# Patient Record
Sex: FEMALE | Race: WHITE | HISPANIC OR LATINO | Employment: UNEMPLOYED | URBAN - METROPOLITAN AREA
[De-identification: names, ages, dates, MRNs, and addresses within clinical notes are randomized per-mention and may not be internally consistent; named-entity substitution may affect disease eponyms.]

---
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
## 2025-02-21 ENCOUNTER — OFFICE VISIT (OUTPATIENT)
Dept: FAMILY MEDICINE CLINIC | Facility: CLINIC | Age: 20
End: 2025-02-21
Payer: MEDICAID

## 2025-02-21 VITALS
HEART RATE: 65 BPM | OXYGEN SATURATION: 100 % | SYSTOLIC BLOOD PRESSURE: 91 MMHG | DIASTOLIC BLOOD PRESSURE: 61 MMHG | TEMPERATURE: 97.3 F | BODY MASS INDEX: 24.29 KG/M2 | HEIGHT: 62 IN | RESPIRATION RATE: 16 BRPM | WEIGHT: 132 LBS

## 2025-02-21 DIAGNOSIS — N92.6 IRREGULAR PERIODS: ICD-10-CM

## 2025-02-21 DIAGNOSIS — Z00.00 ANNUAL PHYSICAL EXAM: ICD-10-CM

## 2025-02-21 DIAGNOSIS — Z01.419 WOMEN'S ANNUAL ROUTINE GYNECOLOGICAL EXAMINATION: Primary | ICD-10-CM

## 2025-02-21 DIAGNOSIS — F41.8 ANXIETY WITH DEPRESSION: ICD-10-CM

## 2025-02-21 DIAGNOSIS — R63.5 WEIGHT GAIN: ICD-10-CM

## 2025-02-21 DIAGNOSIS — Z86.39 HISTORY OF THYROID DISEASE: ICD-10-CM

## 2025-02-21 DIAGNOSIS — Z83.49 FAMILY HISTORY OF THYROID DISEASE: ICD-10-CM

## 2025-02-21 PROCEDURE — 80053 COMPREHEN METABOLIC PANEL: CPT | Performed by: NURSE PRACTITIONER

## 2025-02-21 PROCEDURE — 83036 HEMOGLOBIN GLYCOSYLATED A1C: CPT | Performed by: NURSE PRACTITIONER

## 2025-02-21 PROCEDURE — 84443 ASSAY THYROID STIM HORMONE: CPT | Performed by: NURSE PRACTITIONER

## 2025-02-21 PROCEDURE — 80061 LIPID PANEL: CPT | Performed by: NURSE PRACTITIONER

## 2025-02-21 NOTE — ASSESSMENT & PLAN NOTE
Irregular periods- She reports she will not have her period some months and other months she will have it for weeks and is heavy.

## 2025-02-21 NOTE — PATIENT INSTRUCTIONS
Taper off zoloft very slowly- take 1/2 tablet every day for 2 weeks, then every other day for a week, and then every 3rd day and so on   Continue counseling   Encourage healthy diet and exercise  Encouraged monthly self breast exams  Check labs  Referral to GYN

## 2025-02-21 NOTE — ASSESSMENT & PLAN NOTE
Annual physical- Denies any new family history. Denies smoking, drinking, or illegal drug use.       Labs-Due  Papsmear-Due      Vaccines:  Covid-19-Received some doses, not sure if receiving anymore    Dental exam-UTD   Eye exam-UTD        Encourage healthy diet and exercise  Encouraged monthly self breast exams  Check labs  Referral to GYN

## 2025-02-21 NOTE — PROGRESS NOTES
Venipuncture performed in [facility] by RT Sin  with good hemostasis. Patient tolerated well. 02/21/25 Mikala Hull, RT

## 2025-02-21 NOTE — ASSESSMENT & PLAN NOTE
Anxiety with depression- She is wanting to get off the zoloft because she has gained weight and having changes in her body. She does see therapist. Denies SI or HI.     Taper off zoloft very slowly- take 1/2 tablet every day for 2 weeks, then every other day for a week, and then every 3rd day and so on   Continue counseling - She reports she does this once a week

## 2025-02-21 NOTE — ASSESSMENT & PLAN NOTE
She has had fluctuating weight gain since November.     Could be secondary to possible PCOS versus side effect of zoloft    Patient is tapering off zoloft slowly  Referral to GYN for possible PCOS

## 2025-02-21 NOTE — PROGRESS NOTES
"Chief Complaint  Chief Complaint   Patient presents with    Annual Exam     Physical. Patient has gained weight fast,her menstrual cycles are heavy and irregular,she is having a lot of anxiety and trouble sleeping.         Subjective          Arlene Storng is a 19-year-old female who presents to the office today due to annual physical.     Annual physical- Denies any new family history. Denies smoking, drinking, or illegal drug use.     Irregular periods- She reports she will not have her period some months and other months she will have it for weeks and is heavy.     Anxiety with depression- She is wanting to get off the zoloft because she has gained weight and having changes in her body. She does see therapist. Denies SI or HI.           Labs-Due  Papsmear-Due      Vaccines:  Covid-19-Received some doses, not sure if receiving anymore    Dental exam-UTD   Eye exam-UTD          Review of Systems   Constitutional:  Negative for chills and fever.   Respiratory:  Positive for shortness of breath.    Cardiovascular:  Negative for chest pain.   Gastrointestinal:  Negative for abdominal pain, nausea and vomiting.   Genitourinary:  Negative for difficulty urinating.   Musculoskeletal:  Negative for myalgias.   Skin: Negative.    Neurological:  Negative for dizziness, light-headedness and headache.   Psychiatric/Behavioral:  Positive for depressed mood. Negative for self-injury and suicidal ideas. The patient is nervous/anxious.         Objective   Vital Signs:   Vitals:    02/21/25 1126   BP: 91/61   Pulse: 65   Resp: 16   Temp: 97.3 °F (36.3 °C)   SpO2: 100%      Estimated body mass index is 24.14 kg/m² as calculated from the following:    Height as of this encounter: 157.5 cm (62\").    Weight as of this encounter: 59.9 kg (132 lb).    Pediatric BMI = 73 %ile (Z= 0.61) based on CDC (Girls, 2-20 Years) BMI-for-age based on BMI available on 2/21/2025.. BMI is within normal parameters. No other follow-up for BMI " required.                    Physical Exam  Vitals reviewed.   Constitutional:       Appearance: Normal appearance. She is normal weight.   HENT:      Head: Normocephalic and atraumatic.      Right Ear: Tympanic membrane, ear canal and external ear normal.      Left Ear: Tympanic membrane, ear canal and external ear normal.      Nose: Nose normal.      Mouth/Throat:      Mouth: Mucous membranes are moist.      Pharynx: Oropharynx is clear.   Eyes:      Extraocular Movements: Extraocular movements intact.      Conjunctiva/sclera: Conjunctivae normal.      Pupils: Pupils are equal, round, and reactive to light.   Cardiovascular:      Rate and Rhythm: Normal rate and regular rhythm.      Pulses: Normal pulses.      Heart sounds: Normal heart sounds.      Comments: S1, S2 audible  Pulmonary:      Effort: Pulmonary effort is normal.      Breath sounds: Normal breath sounds.      Comments: On room air   Abdominal:      General: Abdomen is flat. Bowel sounds are normal.      Palpations: Abdomen is soft.   Musculoskeletal:         General: Normal range of motion.      Cervical back: Normal range of motion and neck supple.   Skin:     General: Skin is warm and dry.   Neurological:      General: No focal deficit present.      Mental Status: She is alert and oriented to person, place, and time. Mental status is at baseline.   Psychiatric:         Mood and Affect: Mood normal.         Behavior: Behavior normal.         Thought Content: Thought content normal.         Judgment: Judgment normal.                Physical Exam   Result Review :             Procedures       Assessment and Plan     Diagnoses and all orders for this visit:    1. Women's annual routine gynecological examination (Primary)  -     Ambulatory Referral to Obstetrics / Gynecology    2. Irregular periods  Assessment & Plan:  Irregular periods- She reports she will not have her period some months and other months she will have it for weeks and is heavy.      Orders:  -     Ambulatory Referral to Obstetrics / Gynecology    3. Annual physical exam  Assessment & Plan:    Annual physical- Denies any new family history. Denies smoking, drinking, or illegal drug use.       Labs-Due  Papsmear-Due      Vaccines:  Covid-19-Received some doses, not sure if receiving anymore    Dental exam-UTD   Eye exam-UTD        Encourage healthy diet and exercise  Encouraged monthly self breast exams  Check labs  Referral to GYN    Orders:  -     Comprehensive Metabolic Panel  -     Hemoglobin A1c  -     Lipid Panel    4. Anxiety with depression  Assessment & Plan:  Anxiety with depression- She is wanting to get off the zoloft because she has gained weight and having changes in her body. She does see therapist. Denies SI or HI.     Taper off zoloft very slowly- take 1/2 tablet every day for 2 weeks, then every other day for a week, and then every 3rd day and so on   Continue counseling - She reports she does this once a week       5. History of thyroid disease    6. Family history of thyroid disease  -     TSH    7. Weight gain  Assessment & Plan:  She has had fluctuating weight gain since November.     Could be secondary to possible PCOS versus side effect of zoloft    Patient is tapering off zoloft slowly  Referral to GYN for possible PCOS                 Follow Up   Return in about 1 year (around 2/21/2026) for Annual physical.   Patient was given instructions and counseling regarding her condition or for health maintenance advice. Please see specific information pulled into the AVS if appropriate.

## 2025-02-22 LAB
ALBUMIN SERPL-MCNC: 4.5 G/DL (ref 3.5–5.2)
ALBUMIN/GLOB SERPL: 1.3 G/DL
ALP SERPL-CCNC: 134 U/L (ref 39–117)
ALT SERPL W P-5'-P-CCNC: 65 U/L (ref 1–33)
ANION GAP SERPL CALCULATED.3IONS-SCNC: 12 MMOL/L (ref 5–15)
AST SERPL-CCNC: 42 U/L (ref 1–32)
BILIRUB SERPL-MCNC: 0.4 MG/DL (ref 0–1.2)
BUN SERPL-MCNC: 8 MG/DL (ref 6–20)
BUN/CREAT SERPL: 12.7 (ref 7–25)
CALCIUM SPEC-SCNC: 9.7 MG/DL (ref 8.6–10.5)
CHLORIDE SERPL-SCNC: 100 MMOL/L (ref 98–107)
CHOLEST SERPL-MCNC: 197 MG/DL (ref 0–200)
CO2 SERPL-SCNC: 24 MMOL/L (ref 22–29)
CREAT SERPL-MCNC: 0.63 MG/DL (ref 0.57–1)
EGFRCR SERPLBLD CKD-EPI 2021: 131.2 ML/MIN/1.73
GLOBULIN UR ELPH-MCNC: 3.4 GM/DL
GLUCOSE SERPL-MCNC: 78 MG/DL (ref 65–99)
HBA1C MFR BLD: 5.3 % (ref 4.8–5.6)
HDLC SERPL-MCNC: 61 MG/DL (ref 40–60)
LDLC SERPL CALC-MCNC: 125 MG/DL (ref 0–100)
LDLC/HDLC SERPL: 2.03 {RATIO}
POTASSIUM SERPL-SCNC: 4.1 MMOL/L (ref 3.5–5.2)
PROT SERPL-MCNC: 7.9 G/DL (ref 6–8.5)
SODIUM SERPL-SCNC: 136 MMOL/L (ref 136–145)
TRIGL SERPL-MCNC: 61 MG/DL (ref 0–150)
TSH SERPL DL<=0.05 MIU/L-ACNC: 2.21 UIU/ML (ref 0.27–4.2)
VLDLC SERPL-MCNC: 11 MG/DL (ref 5–40)

## 2025-02-24 DIAGNOSIS — R79.89 ELEVATED LFTS: Primary | ICD-10-CM

## 2025-05-20 ENCOUNTER — TRANSCRIBE ORDERS (OUTPATIENT)
Dept: ADMINISTRATIVE | Facility: HOSPITAL | Age: 20
End: 2025-05-20
Payer: MEDICAID

## 2025-05-20 DIAGNOSIS — E22.9 PITUITARY HYPERFUNCTION: Primary | ICD-10-CM

## 2025-05-28 NOTE — PROGRESS NOTES
"Chief Complaint  Chief Complaint   Patient presents with    Thyroid Problem    Anxiety    Depression    Weight Check        Subjective          Arlene Strong is a 20-year-old female who reports to the office today for follow-up on anxiety or depression.    Anxiety with depression- She is on wellbutrin and it is helping her with her appetite, depression, or anxiety. Denies SI or HI.     Possible pituitary issues- She saw GYN and has labs drawn that showed elevated testosterone and estrogen. Gyn thinks possible cyst on pituitary gland and she has an MRI on Monday. She was prescribed wellbutrin from GYN.               Review of Systems   Constitutional:  Negative for chills and fever.   HENT:  Negative for congestion.    Respiratory:  Negative for shortness of breath.    Cardiovascular:  Negative for chest pain.   Gastrointestinal:  Negative for abdominal pain, nausea and vomiting.   Genitourinary:  Negative for difficulty urinating.   Musculoskeletal:  Negative for myalgias.   Skin: Negative.    Neurological:  Negative for dizziness, light-headedness and headache.   Psychiatric/Behavioral:  Negative for self-injury, suicidal ideas and depressed mood. The patient is not nervous/anxious.         Objective   Vital Signs:   Vitals:    06/03/25 1608   BP: 97/65   Pulse: 82   Temp: 98.6 °F (37 °C)   SpO2: 97%      Estimated body mass index is 24.69 kg/m² as calculated from the following:    Height as of this encounter: 157.5 cm (62.01\").    Weight as of this encounter: 61.2 kg (135 lb).    BMI is within normal parameters. No other follow-up for BMI required.            Patient screened negative for depression based on a PHQ-9 score of 0 on 6/3/2025 . Follow-up recommendations include: PCP managing depression.        Physical Exam  Vitals reviewed.   Constitutional:       Appearance: Normal appearance. She is normal weight.   HENT:      Head: Normocephalic and atraumatic.      Nose: Nose normal.      Mouth/Throat:      Mouth: " Mucous membranes are moist.      Pharynx: Oropharynx is clear.   Eyes:      Extraocular Movements: Extraocular movements intact.      Conjunctiva/sclera: Conjunctivae normal.   Cardiovascular:      Rate and Rhythm: Normal rate and regular rhythm.      Pulses: Normal pulses.      Heart sounds: Normal heart sounds.      Comments: S1, S2 audible  Pulmonary:      Effort: Pulmonary effort is normal.      Breath sounds: Normal breath sounds.      Comments: On room air   Abdominal:      General: Abdomen is flat.   Musculoskeletal:         General: Normal range of motion.      Cervical back: Normal range of motion.   Skin:     General: Skin is warm and dry.   Neurological:      General: No focal deficit present.      Mental Status: She is alert and oriented to person, place, and time. Mental status is at baseline.   Psychiatric:         Mood and Affect: Mood normal.         Behavior: Behavior normal.         Thought Content: Thought content normal.         Judgment: Judgment normal.                Physical Exam   Result Review :             Procedures       Assessment and Plan     Diagnoses and all orders for this visit:    1. Anxiety with depression (Primary)  Assessment & Plan:  Anxiety with depression- She is on wellbutrin and it is helping her with her appetite, depression, or anxiety. Denies SI or HI.       Continue wellbutrin      2. Pituitary hyperfunction  Assessment & Plan:  Patient has MRI of pituitary gland and US of ovaries ordered for tomorrow at [facility]    GYN ruling out PCOS       3. Elevated LFTs  Assessment & Plan:  Check BMP  She reports she has been eating a lot healthier     Orders:  -     Comprehensive Metabolic Panel; Future    Other orders  -     buPROPion (WELLBUTRIN) 75 MG tablet; Take 1 tablet by mouth Daily.  Dispense: 90 tablet; Refill: 1              Follow Up   Return for Next scheduled follow up.   Patient was given instructions and counseling regarding her condition or for health maintenance  advice. Please see specific information pulled into the AVS if appropriate.

## 2025-06-03 ENCOUNTER — OFFICE VISIT (OUTPATIENT)
Dept: FAMILY MEDICINE CLINIC | Facility: CLINIC | Age: 20
End: 2025-06-03
Payer: MEDICAID

## 2025-06-03 VITALS
OXYGEN SATURATION: 97 % | WEIGHT: 135 LBS | SYSTOLIC BLOOD PRESSURE: 97 MMHG | HEIGHT: 62 IN | TEMPERATURE: 98.6 F | BODY MASS INDEX: 24.84 KG/M2 | HEART RATE: 82 BPM | DIASTOLIC BLOOD PRESSURE: 65 MMHG

## 2025-06-03 DIAGNOSIS — F41.8 ANXIETY WITH DEPRESSION: Primary | ICD-10-CM

## 2025-06-03 DIAGNOSIS — R79.89 ELEVATED LFTS: ICD-10-CM

## 2025-06-03 DIAGNOSIS — E22.9 PITUITARY HYPERFUNCTION: ICD-10-CM

## 2025-06-03 PROBLEM — R63.5 WEIGHT GAIN: Status: RESOLVED | Noted: 2025-02-21 | Resolved: 2025-06-03

## 2025-06-03 PROCEDURE — 1160F RVW MEDS BY RX/DR IN RCRD: CPT | Performed by: NURSE PRACTITIONER

## 2025-06-03 PROCEDURE — 1159F MED LIST DOCD IN RCRD: CPT | Performed by: NURSE PRACTITIONER

## 2025-06-03 PROCEDURE — 99214 OFFICE O/P EST MOD 30 MIN: CPT | Performed by: NURSE PRACTITIONER

## 2025-06-03 PROCEDURE — 1126F AMNT PAIN NOTED NONE PRSNT: CPT | Performed by: NURSE PRACTITIONER

## 2025-06-03 RX ORDER — BUPROPION HYDROCHLORIDE 75 MG/1
75 TABLET ORAL DAILY
Qty: 90 TABLET | Refills: 1 | Status: SHIPPED | OUTPATIENT
Start: 2025-06-03

## 2025-06-03 RX ORDER — BUPROPION HYDROCHLORIDE 75 MG/1
1 TABLET ORAL DAILY
COMMUNITY
Start: 2025-05-13 | End: 2025-06-03 | Stop reason: SDUPTHER

## 2025-06-03 NOTE — ASSESSMENT & PLAN NOTE
Patient has MRI of pituitary gland and US of ovaries ordered for tomorrow at [facility]    GYN ruling out PCOS

## 2025-06-03 NOTE — ASSESSMENT & PLAN NOTE
Anxiety with depression- She is on wellbutrin and it is helping her with her appetite, depression, or anxiety. Denies SI or HI.       Continue wellbutrin

## 2025-06-09 ENCOUNTER — HOSPITAL ENCOUNTER (OUTPATIENT)
Dept: MRI IMAGING | Facility: HOSPITAL | Age: 20
Discharge: HOME | End: 2025-06-09
Admitting: STUDENT IN AN ORGANIZED HEALTH CARE EDUCATION/TRAINING PROGRAM
Payer: MEDICAID

## 2025-06-09 DIAGNOSIS — E22.9 PITUITARY HYPERFUNCTION: ICD-10-CM

## 2025-06-09 PROCEDURE — A9577 INJ MULTIHANCE: HCPCS | Performed by: STUDENT IN AN ORGANIZED HEALTH CARE EDUCATION/TRAINING PROGRAM

## 2025-06-09 PROCEDURE — 25510000002 GADOBENATE DIMEGLUMINE 529 MG/ML SOLUTION: Performed by: STUDENT IN AN ORGANIZED HEALTH CARE EDUCATION/TRAINING PROGRAM

## 2025-06-09 PROCEDURE — 70553 MRI BRAIN STEM W/O & W/DYE: CPT

## 2025-06-09 RX ADMIN — GADOBENATE DIMEGLUMINE 20 ML: 529 INJECTION, SOLUTION INTRAVENOUS at 15:35

## 2025-07-15 ENCOUNTER — OFFICE VISIT (OUTPATIENT)
Dept: FAMILY MEDICINE CLINIC | Facility: CLINIC | Age: 20
End: 2025-07-15
Payer: MEDICAID

## 2025-07-15 VITALS
TEMPERATURE: 98 F | OXYGEN SATURATION: 97 % | RESPIRATION RATE: 18 BRPM | BODY MASS INDEX: 24.84 KG/M2 | HEIGHT: 62 IN | SYSTOLIC BLOOD PRESSURE: 90 MMHG | WEIGHT: 135 LBS | HEART RATE: 107 BPM | DIASTOLIC BLOOD PRESSURE: 61 MMHG

## 2025-07-15 DIAGNOSIS — U07.1 COVID-19 VIRUS INFECTION: Primary | ICD-10-CM

## 2025-07-15 DIAGNOSIS — E28.2 PCOS (POLYCYSTIC OVARIAN SYNDROME): ICD-10-CM

## 2025-07-15 DIAGNOSIS — R05.9 COUGH, UNSPECIFIED TYPE: ICD-10-CM

## 2025-07-15 LAB
EXPIRATION DATE: ABNORMAL
EXPIRATION DATE: NORMAL
INTERNAL CONTROL: ABNORMAL
INTERNAL CONTROL: NORMAL
Lab: ABNORMAL
Lab: NORMAL
S PYO AG THROAT QL: NEGATIVE
SARS-COV-2 AG UPPER RESP QL IA.RAPID: DETECTED

## 2025-07-15 PROCEDURE — 87426 SARSCOV CORONAVIRUS AG IA: CPT | Performed by: FAMILY MEDICINE

## 2025-07-15 PROCEDURE — 1125F AMNT PAIN NOTED PAIN PRSNT: CPT | Performed by: FAMILY MEDICINE

## 2025-07-15 PROCEDURE — 1160F RVW MEDS BY RX/DR IN RCRD: CPT | Performed by: FAMILY MEDICINE

## 2025-07-15 PROCEDURE — 87880 STREP A ASSAY W/OPTIC: CPT | Performed by: FAMILY MEDICINE

## 2025-07-15 PROCEDURE — 99213 OFFICE O/P EST LOW 20 MIN: CPT | Performed by: FAMILY MEDICINE

## 2025-07-15 PROCEDURE — 1159F MED LIST DOCD IN RCRD: CPT | Performed by: FAMILY MEDICINE

## 2025-07-15 RX ORDER — CHLORAL HYDRATE 500 MG
1000 CAPSULE ORAL
COMMUNITY

## 2025-07-15 RX ORDER — NORETHINDRONE ACETATE AND ETHINYL ESTRADIOL, ETHINYL ESTRADIOL AND FERROUS FUMARATE 1MG-10(24)
1 KIT ORAL DAILY
Start: 2025-07-15

## 2025-07-15 NOTE — Clinical Note
July 15, 2025     Patient: Arlene Strong   YOB: 2005   Date of Visit: 7/15/2025       To Whom It May Concern:    It is my medical opinion that Arlene Strong may return to work in two days.         Sincerely,        Antonietta Hughes DO    CC: No Recipients

## 2025-07-15 NOTE — LETTER
July 15, 2025     Patient: Arlene Strong   YOB: 2005   Date of Visit: 7/15/2025       To Whom it May Concern:    Arlene Strong was seen in my clinic on 7/15/2025. She tested positive for COVID 19.           Sincerely,          Antonietta Hughes, DO

## 2025-07-15 NOTE — PROGRESS NOTES
Subjective   Arlene Strong is a 20 y.o. female.     Chief Complaint   Patient presents with    Sore Throat     Congestion,Fever,Sore throat,Fatigue,headache since yesterday.         Current Outpatient Medications:     Berberine Chloride (BERBERINE HCI PO), Take 1 tablet by mouth Daily., Disp: , Rfl:     buPROPion (WELLBUTRIN) 75 MG tablet, Take 1 tablet by mouth Daily., Disp: 90 tablet, Rfl: 1    INOSITOL PO, Take 1 tablet by mouth Daily., Disp: , Rfl:     Omega-3 Fatty Acids (fish oil) 1000 MG capsule capsule, Take 1 capsule by mouth Daily With Breakfast., Disp: , Rfl:     Probiotic Product (PROBIOTIC BLEND PO), Take 1 tablet by mouth Daily., Disp: , Rfl:     Norethin-Eth Estrad-Fe Biphas (Lo Loestrin Fe) 1 MG-10 MCG / 10 MCG tablet, Take 1 tablet by mouth Daily., Disp: , Rfl:     Past Medical History:   Diagnosis Date    Anxiety     Depression     Kidney stone 06/03/2024    passed with no complications    PCOS (polycystic ovarian syndrome)        History reviewed. No pertinent surgical history.    Family History   Problem Relation Age of Onset    Other Father         VERTIGO; MENIERES DISEASE; HEMAGIOBLASTOMA    Heart disease Maternal Grandmother     Diabetes Maternal Grandmother     Heart disease Maternal Grandfather     Diabetes Maternal Grandfather     Heart disease Paternal Grandmother     Diabetes Paternal Grandmother     Heart disease Paternal Grandfather     Diabetes Paternal Grandfather     Cancer Other        Social History     Socioeconomic History    Marital status: Single   Tobacco Use    Smoking status: Never     Passive exposure: Never    Smokeless tobacco: Never   Vaping Use    Vaping status: Never Used   Substance and Sexual Activity    Alcohol use: Never    Drug use: Never    Sexual activity: Defer       History of Present Illness  The patient is a 20-year-old female who presents with fever and sore throat.    She attended a music festival 2 weeks ago and has been socializing at various parties  since then. She reports a sore throat and a cough, which she believes may be worsening. She has to cough to clear the mucus. She has no history of asthma. She has had COVID-19 once before.    She was recently diagnosed with polycystic ovary syndrome (PCOS) by Dr. Nanci Sandoval, an OB/GYN at [facility] in [place]. She has gained approximately 30 pounds without any changes in her lifestyle. She has been attending the gym frequently and has made significant changes to her diet, including reducing her intake of fast food. She is currently focusing on weight loss and managing her symptoms of facial hair growth and hair loss. She has been prescribed Loestrin Fe for birth control but expresses apprehension about using it. Her menstrual cycles are irregular.    Diet: She has made significant changes to her diet, including reducing her intake of fast food.    GYNECOLOGICAL HISTORY:  Frequency and Flow: Irregular        The following portions of the patient's history were reviewed and updated as appropriate: allergies, current medications, past family history, past medical history, past social history, past surgical history and problem list.    Review of Systems   Constitutional:  Positive for activity change, fever and unexpected weight gain. Negative for appetite change, chills, fatigue and unexpected weight loss.   HENT:  Positive for congestion and sore throat. Negative for ear discharge, ear pain, postnasal drip, rhinorrhea, sinus pressure, sneezing and swollen glands.    Eyes:  Negative for pain, discharge, redness, itching and visual disturbance.   Respiratory:  Positive for cough. Negative for shortness of breath, wheezing and stridor.    Genitourinary:  Positive for menstrual problem.   Skin:  Negative for rash.   Allergic/Immunologic: Negative for environmental allergies.   Psychiatric/Behavioral:  Negative for sleep disturbance.        Vitals:    07/15/25 1133   BP: 90/61   Pulse: 107   Resp: 18    Temp: 98 °F (36.7 °C)   SpO2: 97%       Objective   Physical Exam  Vitals and nursing note reviewed.   Constitutional:       General: She is not in acute distress.     Appearance: She is well-developed. She is not ill-appearing, toxic-appearing or diaphoretic.   HENT:      Head: Normocephalic and atraumatic.      Right Ear: Tympanic membrane, ear canal and external ear normal. There is no impacted cerumen.      Left Ear: Tympanic membrane, ear canal and external ear normal. There is no impacted cerumen.      Nose: Nose normal. No congestion or rhinorrhea.      Mouth/Throat:      Mouth: Mucous membranes are moist.      Pharynx: Oropharynx is clear. Posterior oropharyngeal erythema present. No oropharyngeal exudate.   Eyes:      General: No scleral icterus.        Right eye: No discharge.         Left eye: No discharge.      Extraocular Movements: Extraocular movements intact.      Conjunctiva/sclera: Conjunctivae normal.      Pupils: Pupils are equal, round, and reactive to light.   Neck:      Thyroid: No thyromegaly.   Cardiovascular:      Rate and Rhythm: Normal rate and regular rhythm.      Heart sounds: Normal heart sounds. No murmur heard.  Pulmonary:      Effort: Pulmonary effort is normal. No respiratory distress.      Breath sounds: Normal breath sounds. No wheezing or rales.   Abdominal:      General: Bowel sounds are normal.      Palpations: Abdomen is soft.   Musculoskeletal:      Cervical back: Normal range of motion and neck supple.   Lymphadenopathy:      Cervical: No cervical adenopathy.   Skin:     General: Skin is warm and dry.      Coloration: Skin is not pale.      Findings: No erythema or rash.   Neurological:      Mental Status: She is alert and oriented to person, place, and time.      Cranial Nerves: No cranial nerve deficit.   Psychiatric:         Attention and Perception: Attention normal.         Mood and Affect: Mood and affect normal.         Speech: Speech normal.         Behavior:  Behavior normal. Behavior is cooperative.         Thought Content: Thought content normal.         Cognition and Memory: Cognition and memory normal.         Judgment: Judgment normal.       Physical Exam  Mouth/Throat: Irritation noted in the throat.  Respiratory: Clear to auscultation, no wheezing, rales or rhonchi     BMI is within normal parameters. No other follow-up for BMI required.      Assessment & Plan   Diagnoses and all orders for this visit:    1. COVID-19 virus infection (Primary)  -     POCT rapid strep A    2. Cough, unspecified type  -     Cancel: POCT SARS-CoV-2 Antigen ENEIDA + Flu  -     POCT SARS-CoV-2 Antigen ENEIDA    3. PCOS (polycystic ovarian syndrome)    Other orders  -     Norethin-Eth Estrad-Fe Biphas (Lo Loestrin Fe) 1 MG-10 MCG / 10 MCG tablet; Take 1 tablet by mouth Daily.      Assessment & Plan  1. COVID-19.  - Reports sore throat and worsening cough, requiring frequent clearing of mucus.  - No history of asthma; previously had COVID-19 once.  - Advised to use over-the-counter cough and cold medications, cough drops, and antihistamines such as Claritin, Allegra, or Zyrtec for postnasal drip. Cepacol or Chloraseptic lozenges recommended for severe sore throat.  - Prescription for Paxlovid will be provided if requested within the next few days.    2. Polycystic Ovary Syndrome (PCOS).  - Recently diagnosed with PCOS; under the care of Dr. Nanci Sandoval at [facility].  - Prescribed Loestrin Fe for birth control; expresses apprehension about using it.  - Advised to maintain a healthy diet and regular exercise regimen; can discontinue birth control if experiencing weight gain.  - Counseling provided on managing symptoms such as facial hair and hair loss.     Results            Patient or patient representative verbalized consent for the use of Ambient Listening during the visit with  Antonietta Hughes DO for chart documentation. 8/2/2025  21:39 EDT